# Patient Record
Sex: MALE | URBAN - METROPOLITAN AREA
[De-identification: names, ages, dates, MRNs, and addresses within clinical notes are randomized per-mention and may not be internally consistent; named-entity substitution may affect disease eponyms.]

---

## 2019-06-19 ENCOUNTER — APPOINTMENT (RX ONLY)
Dept: URBAN - METROPOLITAN AREA CLINIC 302 | Facility: CLINIC | Age: 34
Setting detail: DERMATOLOGY
End: 2019-06-19

## 2019-06-19 DIAGNOSIS — D18.0 HEMANGIOMA: ICD-10-CM

## 2019-06-19 DIAGNOSIS — D22 MELANOCYTIC NEVI: ICD-10-CM

## 2019-06-19 PROBLEM — D18.01 HEMANGIOMA OF SKIN AND SUBCUTANEOUS TISSUE: Status: ACTIVE | Noted: 2019-06-19

## 2019-06-19 PROBLEM — D22.0 MELANOCYTIC NEVI OF LIP: Status: ACTIVE | Noted: 2019-06-19

## 2019-06-19 PROBLEM — D22.5 MELANOCYTIC NEVI OF TRUNK: Status: ACTIVE | Noted: 2019-06-19

## 2019-06-19 PROCEDURE — ? ADDITIONAL NOTES

## 2019-06-19 PROCEDURE — ? COUNSELING

## 2019-06-19 PROCEDURE — ? DEFER

## 2019-06-19 PROCEDURE — ? OBSERVATION AND MEASURE

## 2019-06-19 PROCEDURE — 99202 OFFICE O/P NEW SF 15 MIN: CPT

## 2019-06-19 ASSESSMENT — LOCATION DETAILED DESCRIPTION DERM
LOCATION DETAILED: DORSAL PENILE SHAFT
LOCATION DETAILED: RIGHT UPPER CUTANEOUS LIP
LOCATION DETAILED: PERIUMBILICAL SKIN

## 2019-06-19 ASSESSMENT — LOCATION SIMPLE DESCRIPTION DERM
LOCATION SIMPLE: RIGHT LIP
LOCATION SIMPLE: PENIS
LOCATION SIMPLE: ABDOMEN

## 2019-06-19 ASSESSMENT — LOCATION ZONE DERM
LOCATION ZONE: PENIS
LOCATION ZONE: TRUNK
LOCATION ZONE: LIP

## 2019-06-19 NOTE — HPI: SKIN LESION
How Severe Is Your Skin Lesion?: moderate
Has Your Skin Lesion Been Treated?: not been treated
Is This A New Presentation, Or A Follow-Up?: Skin Lesions
Additional History: Pt reports a longstanding history of 2 lesions:\\n1. Right upper cutaneous lip - present whole life unchanging.\\n2. Dorsal penile shaft - present several years at least, unchanging.

## 2019-06-19 NOTE — PROCEDURE: ADDITIONAL NOTES
Detail Level: Simple
Additional Notes: For both noted lesions (face and penis)\\nI discussed R/B/A including incomplete removal, scarring, infection etc.  Pt states he is inclined to not have lesions removed.

## 2019-06-19 NOTE — PROCEDURE: DEFER
Detail Level: Simple
Introduction Text (Please End With A Colon): :)
Introduction Text (Please End With A Colon): :)
Detail Level: Zone